# Patient Record
Sex: FEMALE | Race: BLACK OR AFRICAN AMERICAN
[De-identification: names, ages, dates, MRNs, and addresses within clinical notes are randomized per-mention and may not be internally consistent; named-entity substitution may affect disease eponyms.]

---

## 2019-10-17 ENCOUNTER — HOSPITAL ENCOUNTER (EMERGENCY)
Dept: HOSPITAL 62 - ER | Age: 39
LOS: 1 days | Discharge: HOME | End: 2019-10-18
Payer: COMMERCIAL

## 2019-10-17 VITALS — SYSTOLIC BLOOD PRESSURE: 146 MMHG | DIASTOLIC BLOOD PRESSURE: 80 MMHG

## 2019-10-17 DIAGNOSIS — F17.200: ICD-10-CM

## 2019-10-17 DIAGNOSIS — L03.113: Primary | ICD-10-CM

## 2019-10-17 LAB
%HYPO/RBC NFR BLD AUTO: (no result) %
ADD MANUAL DIFF: (no result)
ALBUMIN SERPL-MCNC: 4.2 G/DL (ref 3.5–5)
ALP SERPL-CCNC: 84 U/L (ref 38–126)
ANION GAP SERPL CALC-SCNC: 9 MMOL/L (ref 5–19)
ANISOCYTOSIS BLD QL SMEAR: (no result)
AST SERPL-CCNC: 24 U/L (ref 14–36)
BASOPHILS # BLD AUTO: 0.2 10^3/UL (ref 0–0.2)
BASOPHILS NFR BLD AUTO: 1 % (ref 0–2)
BILIRUB DIRECT SERPL-MCNC: 0.1 MG/DL (ref 0–0.4)
BILIRUB SERPL-MCNC: 0.3 MG/DL (ref 0.2–1.3)
BUN SERPL-MCNC: 13 MG/DL (ref 7–20)
CALCIUM: 9.4 MG/DL (ref 8.4–10.2)
CHLORIDE SERPL-SCNC: 104 MMOL/L (ref 98–107)
CO2 SERPL-SCNC: 24 MMOL/L (ref 22–30)
CRP SERPL-MCNC: 6.3 MG/L (ref ?–10)
EOSINOPHIL # BLD AUTO: 0.8 10^3/UL (ref 0–0.6)
EOSINOPHIL NFR BLD AUTO: 5.2 % (ref 0–6)
ERYTHROCYTE [DISTWIDTH] IN BLOOD BY AUTOMATED COUNT: 20.2 % (ref 11.5–14)
ERYTHROCYTE [SEDIMENTATION RATE] IN BLOOD: 13 MM/HR (ref 0–20)
GLUCOSE SERPL-MCNC: 98 MG/DL (ref 75–110)
HCT VFR BLD CALC: 26.8 % (ref 36–47)
HGB BLD-MCNC: 7.7 G/DL (ref 12–15.5)
LYMPHOCYTES # BLD AUTO: 4.2 10^3/UL (ref 0.5–4.7)
LYMPHOCYTES NFR BLD AUTO: 28.5 % (ref 13–45)
MCH RBC QN AUTO: 14.7 PG (ref 27–33.4)
MCHC RBC AUTO-ENTMCNC: 28.6 G/DL (ref 32–36)
MCV RBC AUTO: 51 FL (ref 80–97)
MONOCYTES # BLD AUTO: 0.9 10^3/UL (ref 0.1–1.4)
MONOCYTES NFR BLD AUTO: 6.2 % (ref 3–13)
NEUTROPHILS # BLD AUTO: 8.8 10^3/UL (ref 1.7–8.2)
NEUTS SEG NFR BLD AUTO: 59.1 % (ref 42–78)
OVALOCYTES BLD QL SMEAR: SLIGHT
PLATELET # BLD: 298 10^3/UL (ref 150–450)
PLATELET COMMENT: ADEQUATE
POIKILOCYTOSIS BLD QL SMEAR: (no result)
POTASSIUM SERPL-SCNC: 4.2 MMOL/L (ref 3.6–5)
PROT SERPL-MCNC: 7.7 G/DL (ref 6.3–8.2)
RBC # BLD AUTO: 5.24 10^6/UL (ref 3.72–5.28)
SCHISTOCYTES BLD QL SMEAR: SLIGHT
TARGETS BLD QL SMEAR: (no result)
TOTAL CELLS COUNTED % (AUTO): 100 %
WBC # BLD AUTO: 14.9 10^3/UL (ref 4–10.5)

## 2019-10-17 PROCEDURE — 86140 C-REACTIVE PROTEIN: CPT

## 2019-10-17 PROCEDURE — 87040 BLOOD CULTURE FOR BACTERIA: CPT

## 2019-10-17 PROCEDURE — 96365 THER/PROPH/DIAG IV INF INIT: CPT

## 2019-10-17 PROCEDURE — 80053 COMPREHEN METABOLIC PANEL: CPT

## 2019-10-17 PROCEDURE — 99283 EMERGENCY DEPT VISIT LOW MDM: CPT

## 2019-10-17 PROCEDURE — 96375 TX/PRO/DX INJ NEW DRUG ADDON: CPT

## 2019-10-17 PROCEDURE — 36415 COLL VENOUS BLD VENIPUNCTURE: CPT

## 2019-10-17 PROCEDURE — 85025 COMPLETE CBC W/AUTO DIFF WBC: CPT

## 2019-10-17 PROCEDURE — 85652 RBC SED RATE AUTOMATED: CPT

## 2019-10-17 NOTE — ER DOCUMENT REPORT
ED Medical Screen (RME)





- General


Chief Complaint: Skin Problem


Stated Complaint: POSSIBLE SPIDER BITE ON RIGHT HAND


Time Seen by Provider: 10/17/19 17:49


Mode of Arrival: Ambulatory


Information source: Patient


Notes: 





Patient is a 30-year-old female presenting to the emergency department with 

complaints of possible insect bite.  Patient reports she believes she was bitten

sometime yesterday.  She reports that she now has a red streak going up her arm 

from her right hand.  She denies any fevers but reports limited range of motion 

to the right hand and limited strength.





I have greeted and performed a rapid initial assessment of this patient.  A 

comprehensive ED assessment and evaluation of the patient, analysis of test 

results and completion of the medical decision making process will be conducted 

by additional ED providers.  I have specifically instructed the patient or 

family members with the patient to immediately return to any nursing staff 

should anything change in the patient's condition or with their chief complaint.





This medical record was dictated with voice recognizing software.  There may be 

grammatical, syntax errors that are unintended.





TRAVEL OUTSIDE OF THE [place] IN LAST 30 DAYS: No





- Related Data


Allergies/Adverse Reactions: 


                                        





No Known Drug Allergies Allergy (Verified 10/17/19 17:32)


   











Past Medical History





- Social History


Chew tobacco use (# tins/day): No


Frequency of alcohol use: Occasional


Drug Abuse: None


Pulmonary Medical History: Reports: Hx Asthma





- Immunizations


Hx Diphtheria, Pertussis, Tetanus Vaccination: Yes





Physical Exam





- Vital signs


Vitals: 





                                        











Temp Pulse Resp BP Pulse Ox


 


 98.3 F   71   18   146/80 H  98 


 


 10/17/19 17:08  10/17/19 17:08  10/17/19 17:08  10/17/19 17:08  10/17/19 17:08














Course





- Vital Signs


Vital signs: 





                                        











Temp Pulse Resp BP Pulse Ox


 


 98.3 F   71   18   146/80 H  98 


 


 10/17/19 17:08  10/17/19 17:08  10/17/19 17:08  10/17/19 17:08  10/17/19 17:08

## 2019-10-17 NOTE — ER DOCUMENT REPORT
ED Skin Rash/Insect Bite/Abscs





- General


Chief Complaint: Skin Problem


Stated Complaint: POSSIBLE SPIDER BITE ON RIGHT HAND


Time Seen by Provider: 10/17/19 17:49


Mode of Arrival: Ambulatory


TRAVEL OUTSIDE OF THE [place] IN LAST 30 DAYS: No





- HPI


Patient complains to provider of: Skin rash/lesion, Tender/swollen area


Onset: Yesterday


Onset/Duration: Sudden, Gradual


Quality of pain: Achy.  denies: No pain, Burning, Cramping, Dull, Fullness, 

Pressure, Sharp, Stabbing, Throbbing, Other


Severity: Mild


Skin Character: Erythema, Patchy, Warm


Identify cause: No


Medication exposure: denies: Antibiotic, Aspirin, ACE / ARB inhibitor, NSAID, 

Other


Food exposure: denies: Shellfish, Nuts, Soybeans, Eggs, Other


Exacerbated by: denies: Denies, Supine, Sitting, Standing, Movement, Walking, 

Coughing, Deep breathing, Food, Other


Notes: 





Patient claims yesterday that she noticed on her right dorsal surface of her 

right hand a small blister starting thinks she was bitten by something as they 

were fumigating her office since that time she has noticed red streaks up her 

arm to approximately the elbow crease and came in.  She denies any other 

complaints





- Related Data


Allergies/Adverse Reactions: 


                                        





No Known Drug Allergies Allergy (Verified 10/17/19 17:32)


   











Past Medical History





- General


Information source: Patient





- Social History


Smoking Status: Current Some Day Smoker


Chew tobacco use (# tins/day): No


Frequency of alcohol use: Occasional


Drug Abuse: None


Family History: None


Patient has suicidal ideation: No


Patient has homicidal ideation: No


Pulmonary Medical History: Reports: Hx Asthma





- Immunizations


Hx Diphtheria, Pertussis, Tetanus Vaccination: Yes


Hx Pneumococcal Vaccination: 10/01/14





Review of Systems





- Review of Systems


Constitutional: denies: No symptoms reported, See HPI, Chills, Diaphoresis, 

Fever, Malaise, Weakness, Other, Weight gain, Weight loss, Recent illness


Skin: See HPI, Change in color, Rash.  denies: No symptoms reported, Change in 

hair/nails, Dryness, Lesions, Lumps, Other


-: Yes All other systems reviewed and negative





Physical Exam





- Vital signs


Vitals: 





                                        











Temp Pulse Resp BP Pulse Ox


 


 98.3 F   71   18   146/80 H  98 


 


 10/17/19 17:08  10/17/19 17:08  10/17/19 17:08  10/17/19 17:08  10/17/19 17:08











Notes: 





PHYSICAL EXAMINATION:


 


GENERAL: Well-appearing, well-nourished and in no acute distress.


 


HEAD: Atraumatic, normocephalic.


 


EYES: Pupils equal round and reactive to light, extraocular movements intact, 

sclera anicteric, conjunctiva are normal.


 


ENT: nares patent, oropharynx clear without exudates.  Moist mucous membranes.


 


NECK: Normal range of motion, supple without lymphadenopathy


 


LUNGS: Breath sounds clear to auscultation bilaterally and equal.  No wheezes 

rales or rhonchi.


 


HEART: Regular rate and rhythm without murmurs


 


ABDOMEN: Soft, nontender, normoactive bowel sounds.  No guarding, no rebound.  

No masses appreciated.


 


EXTREMITIES: Normal range of motion, no pitting or edema.  No cyanosis.


 


NEUROLOGICAL: No focal neurological deficits. Moves all extremities 

spontaneously and on command.


 


PSYCH: Normal mood, normal affect.


 


SKIN: Right dorsal surface of the hand near the thumb there is a small clear 

vesicle with surrounding erythema up the arm there is lymphangitis to the elbow.

 There are no axillary lymph nodes.  Patient hair all of her fingers including 

the thumb no pain to passive motion of the thumb or fingers no pain to palp to 

flexion extension at the elbow





Course





- Vital Signs


Vital signs: 





                                        











Temp Pulse Resp BP Pulse Ox


 


 98.3 F   71   18   146/80 H  98 


 


 10/17/19 17:08  10/17/19 17:08  10/17/19 17:08  10/17/19 17:08  10/17/19 17:08














- Laboratory


Result Diagrams: 


                                 10/17/19 18:32





                                 10/17/19 18:32


Laboratory results interpreted by me: 





                                        











  10/17/19 10/17/19





  18:32 18:32


 


WBC  14.9 H 


 


Hgb  7.7 L 


 


Hct  26.8 L 


 


MCV  51 L 


 


MCH  14.7 L 


 


MCHC  28.6 L 


 


RDW  20.2 H 


 


Absolute Neuts (auto)  8.8 H 


 


Absolute Eos (auto)  0.8 H 


 


Sodium   136.6 L














- Transfer of Care


Notes: 





10/17/19 23:05


Patient is a healthy nondiabetic female did receive prior to me so seeing her 

from triage some clindamycin IV and claims the redness is even gotten better and

the pain is gotten better.  I will send her home with some Keflex and Bactrim 

and also some Norco for pain and have her follow-up tomorrow for 12-hour follow-

up with regular doctor or return here or if she is worse before then.





Discharge





- Discharge


Clinical Impression: 


 Right arm cellulitis





Condition: Stable


Disposition: HOME, SELF-CARE


Instructions:  Cellulitis ([facility])


Additional Instructions: 


Medicines as directed follow-up in the 12 hours for recheck here in the 

emergency department or with your regular doctor as discussed or sooner if worse


Prescriptions: 


Sulfamethoxazole/Trimethoprim [Bactrim Ds Tablet] 1 each PO Q12 #14 tablet


Cephalexin Monohydrate [Keflex 500 mg Capsule] 500 mg PO Q6H #28 capsule


Hydrocodone/Acetaminophen [Norco 5-325 Tablet] 1 each PO Q6 PRN #10 tablet


 PRN Reason: Pain Scale Of 4


Forms:  Parent Work Note

## 2019-10-18 LAB — PATH REV BLD -IMP: (no result)

## 2019-11-06 ENCOUNTER — HOSPITAL ENCOUNTER (EMERGENCY)
Dept: HOSPITAL 62 - ER | Age: 39
Discharge: HOME | End: 2019-11-06
Payer: SELF-PAY

## 2019-11-06 VITALS — DIASTOLIC BLOOD PRESSURE: 87 MMHG | SYSTOLIC BLOOD PRESSURE: 144 MMHG
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
## 2019-11-06 DIAGNOSIS — S60.561A: Primary | ICD-10-CM

## 2019-11-06 PROCEDURE — 99282 EMERGENCY DEPT VISIT SF MDM: CPT

## 2019-11-06 NOTE — ER DOCUMENT REPORT
HPI





- HPI


Patient complains to provider of: Right hand insect bite


Time Seen by Provider: 11/06/19 09:15


Onset: Yesterday


Onset/Duration: Persistent


Quality of pain: Achy


Severity: Severe


Pain Level: 5


Context: 





This 38-year-old female presents emergency department with insect bite to her 

right hand.  She reports yesterday morning at approximately oh 5:30 in the 

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

her right hand.  Patient does not remember getting bit by anything.  She reports

she did take Benadryl.  She woke up this morning she has a large blister to the 

area.  Denies fever vomiting diarrhea.  Right dorsal hand is slightly swollen.


Associated Symptoms: None


Exacerbated by: Denies


Relieved by: Denies


Similar symptoms previously: No


Recently seen / treated by doctor: No





- CONSTITUTIONAL


Constitutional: DENIES: Fever, Chills





- REPRODUCTIVE


Reproductive: DENIES: Pregnant:





Past Medical History





- General


Information source: Patient


Last Menstrual Period: Current





- Social History


Smoking Status: Never Smoker


Chew tobacco use (# tins/day): No


Frequency of alcohol use: None


Drug Abuse: None


Occupation: Childcare


Family History: None


Patient has suicidal ideation: No


Patient has homicidal ideation: No


Pulmonary Medical History: Reports: Hx Asthma


Surgical Hx: Negative





- Immunizations


Hx Diphtheria, Pertussis, Tetanus Vaccination: Yes


Hx Pneumococcal Vaccination: 10/01/14





Vertical Provider Document





- CONSTITUTIONAL


Agree With Documented VS: Yes


Exam Limitations: No Limitations


General Appearance: WD/WN, No Apparent Distress





- INFECTION CONTROL


TRAVEL OUTSIDE OF THE [place] IN LAST 30 DAYS: No





- HEENT


HEENT: Atraumatic, Normocephalic





- NECK


Neck: Supple





- RESPIRATORY


Respiratory: No Respiratory Distress





- CARDIOVASCULAR


Cardiovascular: Regular Rate





- MUSCULOSKELETAL/EXTREMETIES


Musculoskeletal/Extremeties: MAEW, FROM, Tender - Right dorsal hand fourth and 

fifth metacarpals with blister surrounded by erythema approximately 2 cm.  No 

induration no fluctuance no pustule.





- NEURO


Level of Consciousness: Awake, Alert, Appropriate


Motor/Sensory: No Motor Deficit





- DERM


Integumentary: Warm, Dry


Adult Front & Back Diagram: 


                            __________________________














                            __________________________





 1 - Blister surrounded by erythema.  Approximately 2 cm.  No induration no 

pustule no fluctuance.








Course





- Re-evaluation


Re-evalutation: 





11/06/19 19:21


88-year-old female presents to the emergency department with insect bite to her 

right dorsal hand.  Patient was instructed on signs and symptoms of abscess.  

She was treated with Keflex prophylactically.  She was instructed on Benadryl 

ice packs.  She verbalized understanding to all instructions.  The area was 

marked with a surgical marker.  She was instructed to monitor the site and 

return if the erythema was larger than the marked area.  She verbalized 

understanding to all instruction.





- Vital Signs


Vital signs: 


                                        











Temp Pulse Resp BP Pulse Ox


 


 98.7 F   66   16   136/76 H  100 


 


 11/06/19 07:59  11/06/19 07:59  11/06/19 07:59  11/06/19 07:59  11/06/19 07:59














Discharge





- Discharge


Clinical Impression: 


Insect bite


Qualifiers:


 Encounter type: initial encounter Site of insect bite: hand Laterality: right 

Qualified Code(s): S60.561A - Insect bite (nonvenomous) of right hand, initial 

encounter





Condition: Stable


Disposition: HOME, SELF-CARE


Instructions:  Cephalexin ([facility]), Use of Diphenhydramine, Insect Bites ([facility]), 

Swollen Insect Bite or Sting ([facility])


Additional Instructions: 


*You have been treated for insect bite


*Take medication as prescribed, take Benadryl as indicated


*Monitor the site for signs of infection such as increasing pain,  redness, 

swelling, warmth


*Apply cool packs as indicated


*Follow up with a primary care provider in 5 days


*Return to ED for signs of infection, worsening condition,


  changes, needs











Monitor your blood pressure. Your blood pressure was elevated today.  This may 

be because you were anxious, in pain or because you need medication.  It is 

important to follow up with your primary care provider for full evaluation.


Prescriptions: 


Cephalexin Monohydrate [Keflex 250 Mg Capsule] 250 mg PO QID #20 capsule


Forms:  Elevated Blood Pressure, Return to Work